# Patient Record
Sex: MALE | HISPANIC OR LATINO | URBAN - METROPOLITAN AREA
[De-identification: names, ages, dates, MRNs, and addresses within clinical notes are randomized per-mention and may not be internally consistent; named-entity substitution may affect disease eponyms.]

---

## 2017-01-04 ENCOUNTER — DOCTOR'S OFFICE (OUTPATIENT)
Dept: URBAN - METROPOLITAN AREA CLINIC 136 | Facility: CLINIC | Age: 52
Setting detail: OPHTHALMOLOGY
End: 2017-01-04
Payer: COMMERCIAL

## 2017-01-04 ENCOUNTER — OPTICAL OFFICE (OUTPATIENT)
Dept: URBAN - METROPOLITAN AREA CLINIC 143 | Facility: CLINIC | Age: 52
Setting detail: OPHTHALMOLOGY
End: 2017-01-04

## 2017-01-04 DIAGNOSIS — H52.223: ICD-10-CM

## 2017-01-04 DIAGNOSIS — H52.4: ICD-10-CM

## 2017-01-04 PROCEDURE — V2020 VISION SVCS FRAMES PURCHASES: HCPCS | Performed by: OPTOMETRIST

## 2017-01-04 PROCEDURE — 92015 DETERMINE REFRACTIVE STATE: CPT | Performed by: OPTOMETRIST

## 2017-01-04 PROCEDURE — V2781 PROGRESSIVE LENS PER LENS: HCPCS | Performed by: OPTOMETRIST

## 2017-01-04 ASSESSMENT — REFRACTION_MANIFEST
OD_VA3: 20/
OU_VA: 20/
OD_VA2: 20/
OS_VA1: 20/
OD_VA3: 20/
OD_VA1: 20/
OU_VA: 20/
OS_VA3: 20/
OS_VA2: 20/
OS_VA2: 20/
OD_VA2: 20/
OS_VA3: 20/
OS_VA1: 20/
OD_VA1: 20/

## 2017-01-04 ASSESSMENT — REFRACTION_CURRENTRX
OS_OVR_VA: 20/
OD_OVR_VA: 20/
OS_OVR_VA: 20/
OS_OVR_VA: 20/
OD_OVR_VA: 20/
OD_OVR_VA: 20/

## 2017-01-04 ASSESSMENT — SPHEQUIV_DERIVED
OD_SPHEQUIV: -2
OS_SPHEQUIV: -0.5

## 2017-01-04 ASSESSMENT — REFRACTION_AUTOREFRACTION
OD_CYLINDER: -1.50
OS_SPHERE: +0.25
OS_CYLINDER: -1.50
OD_AXIS: 073
OS_AXIS: 122
OD_SPHERE: -1.25

## 2017-01-04 ASSESSMENT — REFRACTION_OUTSIDERX
OS_AXIS: 120
OD_AXIS: 075
OD_VA1: 20/20
OD_VA3: 20/
OS_CYLINDER: -1.00
OD_SPHERE: -1.00
OD_CYLINDER: -2.00
OS_SPHERE: +0.25
OU_VA: 20/20
OD_ADD: +2.25
OS_ADD: +2.25
OD_VA2: 20/20 OU
OS_VA3: 20/
OS_VA1: 20/20
OS_VA2: 20/

## 2017-01-04 ASSESSMENT — KERATOMETRY
OS_K2POWER_DIOPTERS: 41.75
OS_AXISANGLE_DEGREES: 158
OS_K1POWER_DIOPTERS: 41.00

## 2017-01-04 ASSESSMENT — AXIALLENGTH_DERIVED: OS_AL: 24.6093

## 2017-01-04 ASSESSMENT — VISUAL ACUITY
OS_BCVA: 20/50+2
OD_BCVA: 20/20-2

## 2017-01-20 ENCOUNTER — DOCTOR'S OFFICE (OUTPATIENT)
Dept: URBAN - METROPOLITAN AREA CLINIC 136 | Facility: CLINIC | Age: 52
Setting detail: OPHTHALMOLOGY
End: 2017-01-20
Payer: COMMERCIAL

## 2017-01-20 DIAGNOSIS — Z96.1: ICD-10-CM

## 2017-01-20 DIAGNOSIS — H26.221: ICD-10-CM

## 2017-01-20 DIAGNOSIS — H25.11: ICD-10-CM

## 2017-01-20 DIAGNOSIS — H33.312: ICD-10-CM

## 2017-01-20 DIAGNOSIS — H33.022: ICD-10-CM

## 2017-01-20 DIAGNOSIS — H04.123: ICD-10-CM

## 2017-01-20 DIAGNOSIS — H35.379: ICD-10-CM

## 2017-01-20 PROBLEM — H26.492 POSTERIOR CAPSULAR OPACIFICATION; LEFT EYE: Status: RESOLVED | Noted: 2017-01-20 | Resolved: 2017-01-20

## 2017-01-20 PROBLEM — H44.432 HYPTONY ASSOCIATED WITH OTHER OCULAR DISORDERS; LEFT EYE: Status: RESOLVED | Noted: 2017-01-20 | Resolved: 2017-01-20

## 2017-01-20 PROCEDURE — 92134 CPTRZ OPH DX IMG PST SGM RTA: CPT | Performed by: OPHTHALMOLOGY

## 2017-01-20 PROCEDURE — 92014 COMPRE OPH EXAM EST PT 1/>: CPT | Performed by: OPHTHALMOLOGY

## 2017-01-20 ASSESSMENT — REFRACTION_OUTSIDERX
OD_AXIS: 075
OS_AXIS: 120
OS_VA3: 20/
OS_VA1: 20/20
OD_ADD: +2.25
OD_VA3: 20/
OS_SPHERE: +0.25
OD_SPHERE: -1.00
OS_VA2: 20/
OD_CYLINDER: -2.00
OD_VA2: 20/20 OU
OU_VA: 20/20
OS_CYLINDER: -1.00
OD_VA1: 20/20
OS_ADD: +2.25

## 2017-01-20 ASSESSMENT — CORNEAL SURGICAL SCARRING: OS_SCARRING: CENTRAL ANTERIOR STROMAL

## 2017-01-20 ASSESSMENT — REFRACTION_MANIFEST
OD_VA1: 20/
OS_VA2: 20/
OS_VA3: 20/
OU_VA: 20/
OD_VA3: 20/
OU_VA: 20/
OD_VA2: 20/
OD_VA2: 20/
OS_VA3: 20/
OS_VA1: 20/
OS_VA2: 20/
OS_VA1: 20/
OD_VA3: 20/
OD_VA1: 20/

## 2017-01-20 ASSESSMENT — REFRACTION_AUTOREFRACTION
OD_CYLINDER: -1.50
OS_AXIS: 122
OS_SPHERE: +0.25
OD_AXIS: 073
OS_CYLINDER: -1.50
OD_SPHERE: -1.25

## 2017-01-20 ASSESSMENT — REFRACTION_CURRENTRX
OD_OVR_VA: 20/
OS_OVR_VA: 20/
OD_OVR_VA: 20/
OS_OVR_VA: 20/
OD_OVR_VA: 20/
OS_OVR_VA: 20/

## 2017-01-20 ASSESSMENT — SPHEQUIV_DERIVED
OS_SPHEQUIV: -0.5
OD_SPHEQUIV: -2

## 2017-01-20 ASSESSMENT — CONFRONTATIONAL VISUAL FIELD TEST (CVF)
OD_FINDINGS: FULL
OS_FINDINGS: FULL

## 2017-01-20 ASSESSMENT — VISUAL ACUITY
OS_BCVA: 20/30-2
OD_BCVA: 20/20-3

## 2017-06-23 ENCOUNTER — DOCTOR'S OFFICE (OUTPATIENT)
Dept: URBAN - METROPOLITAN AREA CLINIC 136 | Facility: CLINIC | Age: 52
Setting detail: OPHTHALMOLOGY
End: 2017-06-23
Payer: COMMERCIAL

## 2017-06-23 DIAGNOSIS — Z96.1: ICD-10-CM

## 2017-06-23 DIAGNOSIS — H04.123: ICD-10-CM

## 2017-06-23 DIAGNOSIS — H25.11: ICD-10-CM

## 2017-06-23 PROCEDURE — 99214 OFFICE O/P EST MOD 30 MIN: CPT | Performed by: OPHTHALMOLOGY

## 2017-06-23 ASSESSMENT — REFRACTION_MANIFEST
OD_VA1: 20/
OD_VA3: 20/
OS_VA2: 20/
OS_VA1: 20/
OS_VA3: 20/
OD_VA3: 20/
OS_VA2: 20/
OD_VA2: 20/
OU_VA: 20/
OU_VA: 20/
OS_VA1: 20/
OS_VA3: 20/
OD_VA1: 20/
OD_VA2: 20/

## 2017-06-23 ASSESSMENT — CONFRONTATIONAL VISUAL FIELD TEST (CVF)
OD_FINDINGS: FULL
OS_FINDINGS: FULL

## 2017-06-23 ASSESSMENT — REFRACTION_OUTSIDERX
OS_AXIS: 120
OS_SPHERE: +0.25
OD_AXIS: 075
OD_VA2: 20/20 OU
OD_ADD: +2.25
OD_CYLINDER: -2.00
OD_VA3: 20/
OS_CYLINDER: -1.00
OU_VA: 20/20
OS_ADD: +2.25
OS_VA1: 20/20
OS_VA2: 20/
OS_VA3: 20/
OD_SPHERE: -1.00
OD_VA1: 20/20

## 2017-06-23 ASSESSMENT — REFRACTION_AUTOREFRACTION
OD_AXIS: 073
OS_CYLINDER: -1.50
OD_CYLINDER: -1.50
OS_AXIS: 122
OD_SPHERE: -1.25
OS_SPHERE: +0.25

## 2017-06-23 ASSESSMENT — REFRACTION_CURRENTRX
OS_OVR_VA: 20/
OD_OVR_VA: 20/
OD_OVR_VA: 20/
OS_OVR_VA: 20/
OD_OVR_VA: 20/
OS_OVR_VA: 20/

## 2017-06-23 ASSESSMENT — SUPERFICIAL PUNCTATE KERATITIS (SPK)
OS_SPK: 2+ 3+
OD_SPK: 2+

## 2017-06-23 ASSESSMENT — SPHEQUIV_DERIVED
OD_SPHEQUIV: -2
OS_SPHEQUIV: -0.5

## 2017-06-23 ASSESSMENT — VISUAL ACUITY
OS_BCVA: 20/30-2
OD_BCVA: 20/25

## 2017-06-23 ASSESSMENT — CORNEAL SURGICAL SCARRING: OS_SCARRING: CENTRAL ANTERIOR STROMAL

## 2017-07-03 ENCOUNTER — RX ONLY (RX ONLY)
Age: 52
End: 2017-07-03

## 2017-07-03 ENCOUNTER — DOCTOR'S OFFICE (OUTPATIENT)
Dept: URBAN - METROPOLITAN AREA CLINIC 136 | Facility: CLINIC | Age: 52
Setting detail: OPHTHALMOLOGY
End: 2017-07-03
Payer: COMMERCIAL

## 2017-07-03 DIAGNOSIS — Z96.1: ICD-10-CM

## 2017-07-03 DIAGNOSIS — H04.122: ICD-10-CM

## 2017-07-03 DIAGNOSIS — H04.121: ICD-10-CM

## 2017-07-03 DIAGNOSIS — H25.11: ICD-10-CM

## 2017-07-03 DIAGNOSIS — H40.013: ICD-10-CM

## 2017-07-03 PROCEDURE — 92012 INTRM OPH EXAM EST PATIENT: CPT | Performed by: OPHTHALMOLOGY

## 2017-07-03 PROCEDURE — 83861 MICROFLUID ANALY TEARS: CPT | Performed by: OPHTHALMOLOGY

## 2017-07-03 ASSESSMENT — SUPERFICIAL PUNCTATE KERATITIS (SPK)
OD_SPK: 2+
OS_SPK: 2+ 3+

## 2017-07-03 ASSESSMENT — CONFRONTATIONAL VISUAL FIELD TEST (CVF)
OS_FINDINGS: FULL
OD_FINDINGS: FULL

## 2017-07-03 ASSESSMENT — REFRACTION_MANIFEST
OS_VA1: 20/
OS_VA3: 20/
OS_VA1: 20/
OD_VA3: 20/
OD_VA2: 20/
OD_VA1: 20/
OS_VA3: 20/
OD_VA1: 20/
OD_VA3: 20/
OD_VA2: 20/
OS_VA2: 20/
OU_VA: 20/
OS_VA2: 20/
OU_VA: 20/

## 2017-07-03 ASSESSMENT — REFRACTION_CURRENTRX
OD_OVR_VA: 20/
OS_OVR_VA: 20/
OD_OVR_VA: 20/
OS_OVR_VA: 20/
OS_OVR_VA: 20/
OD_OVR_VA: 20/

## 2017-07-03 ASSESSMENT — REFRACTION_OUTSIDERX
OD_CYLINDER: -2.00
OS_VA1: 20/20
OS_SPHERE: +0.25
OD_AXIS: 075
OD_VA2: 20/20 OU
OD_VA3: 20/
OS_CYLINDER: -1.00
OS_VA2: 20/
OU_VA: 20/20
OD_ADD: +2.25
OD_VA1: 20/20
OS_ADD: +2.25
OS_VA3: 20/
OD_SPHERE: -1.00
OS_AXIS: 120

## 2017-07-03 ASSESSMENT — REFRACTION_AUTOREFRACTION
OD_SPHERE: -1.25
OS_SPHERE: +0.25
OS_CYLINDER: -1.50
OD_CYLINDER: -1.50
OD_AXIS: 073
OS_AXIS: 122

## 2017-07-03 ASSESSMENT — CORNEAL SURGICAL SCARRING: OS_SCARRING: CENTRAL ANTERIOR STROMAL

## 2017-07-03 ASSESSMENT — SPHEQUIV_DERIVED
OS_SPHEQUIV: -0.5
OD_SPHEQUIV: -2

## 2017-07-03 ASSESSMENT — VISUAL ACUITY
OD_BCVA: 20/25
OS_BCVA: 20/40+2

## 2017-07-03 ASSESSMENT — VASCULARIZATION: OS_VASCULARIZATION: STROMAL

## 2017-08-08 ENCOUNTER — GENERIC CONVERSION - ENCOUNTER (OUTPATIENT)
Dept: OTHER | Facility: OTHER | Age: 52
End: 2017-08-08

## 2017-08-22 ENCOUNTER — DOCTOR'S OFFICE (OUTPATIENT)
Dept: URBAN - METROPOLITAN AREA CLINIC 136 | Facility: CLINIC | Age: 52
Setting detail: OPHTHALMOLOGY
End: 2017-08-22
Payer: COMMERCIAL

## 2017-08-22 DIAGNOSIS — H40.013: ICD-10-CM

## 2017-08-22 DIAGNOSIS — H04.121: ICD-10-CM

## 2017-08-22 DIAGNOSIS — H25.11: ICD-10-CM

## 2017-08-22 DIAGNOSIS — Z96.1: ICD-10-CM

## 2017-08-22 DIAGNOSIS — H04.122: ICD-10-CM

## 2017-08-22 PROCEDURE — 92014 COMPRE OPH EXAM EST PT 1/>: CPT | Performed by: OPHTHALMOLOGY

## 2017-08-22 ASSESSMENT — REFRACTION_CURRENTRX
OD_OVR_VA: 20/
OS_OVR_VA: 20/
OD_OVR_VA: 20/
OD_OVR_VA: 20/
OS_OVR_VA: 20/
OS_OVR_VA: 20/

## 2017-08-22 ASSESSMENT — REFRACTION_MANIFEST
OD_VA1: 20/
OU_VA: 20/
OS_VA1: 20/
OS_VA2: 20/
OD_VA2: 20/
OD_VA3: 20/
OU_VA: 20/
OS_VA2: 20/
OS_VA1: 20/
OS_VA3: 20/
OD_VA1: 20/
OD_VA2: 20/
OD_VA3: 20/
OS_VA3: 20/

## 2017-08-22 ASSESSMENT — REFRACTION_OUTSIDERX
OS_CYLINDER: -1.00
OU_VA: 20/20
OS_VA3: 20/
OS_AXIS: 120
OS_VA2: 20/
OD_VA1: 20/20
OS_SPHERE: +0.25
OD_CYLINDER: -2.00
OD_VA3: 20/
OD_VA2: 20/20 OU
OS_VA1: 20/20
OD_ADD: +2.25
OD_SPHERE: -1.00
OS_ADD: +2.25
OD_AXIS: 075

## 2017-08-22 ASSESSMENT — REFRACTION_AUTOREFRACTION
OS_AXIS: 122
OD_AXIS: 073
OS_CYLINDER: -1.50
OS_SPHERE: +0.25
OD_SPHERE: -1.25
OD_CYLINDER: -1.50

## 2017-08-22 ASSESSMENT — VISUAL ACUITY
OS_BCVA: 20/30
OD_BCVA: 20/30+1

## 2017-08-22 ASSESSMENT — CONFRONTATIONAL VISUAL FIELD TEST (CVF)
OD_FINDINGS: FULL
OS_FINDINGS: FULL

## 2017-08-22 ASSESSMENT — SPHEQUIV_DERIVED
OS_SPHEQUIV: -0.5
OD_SPHEQUIV: -2

## 2017-08-22 ASSESSMENT — CORNEAL SURGICAL SCARRING: OS_SCARRING: CENTRAL ANTERIOR STROMAL

## 2017-08-22 ASSESSMENT — VASCULARIZATION: OS_VASCULARIZATION: STROMAL

## 2017-11-14 ENCOUNTER — DOCTOR'S OFFICE (OUTPATIENT)
Dept: URBAN - METROPOLITAN AREA CLINIC 136 | Facility: CLINIC | Age: 52
Setting detail: OPHTHALMOLOGY
End: 2017-11-14
Payer: COMMERCIAL

## 2017-11-14 ENCOUNTER — RX ONLY (RX ONLY)
Age: 52
End: 2017-11-14

## 2017-11-14 DIAGNOSIS — H40.013: ICD-10-CM

## 2017-11-14 DIAGNOSIS — H04.121: ICD-10-CM

## 2017-11-14 DIAGNOSIS — Z96.1: ICD-10-CM

## 2017-11-14 DIAGNOSIS — H04.122: ICD-10-CM

## 2017-11-14 DIAGNOSIS — H25.11: ICD-10-CM

## 2017-11-14 PROCEDURE — 92012 INTRM OPH EXAM EST PATIENT: CPT | Performed by: OPHTHALMOLOGY

## 2017-11-14 ASSESSMENT — REFRACTION_MANIFEST
OS_VA3: 20/
OS_VA2: 20/
OD_VA3: 20/
OS_VA1: 20/
OU_VA: 20/
OS_VA1: 20/
OD_VA1: 20/
OS_VA2: 20/
OS_VA3: 20/
OD_VA2: 20/
OD_VA1: 20/
OD_VA2: 20/
OU_VA: 20/
OD_VA3: 20/

## 2017-11-14 ASSESSMENT — REFRACTION_OUTSIDERX
OD_VA3: 20/
OD_ADD: +2.25
OU_VA: 20/20
OS_ADD: +2.25
OS_VA1: 20/20
OS_VA3: 20/
OS_CYLINDER: -1.00
OS_VA2: 20/
OD_SPHERE: -1.00
OD_VA2: 20/20 OU
OD_CYLINDER: -2.00
OS_AXIS: 120
OD_AXIS: 075
OD_VA1: 20/20
OS_SPHERE: +0.25

## 2017-11-14 ASSESSMENT — CONFRONTATIONAL VISUAL FIELD TEST (CVF)
OS_FINDINGS: FULL
OD_FINDINGS: FULL

## 2017-11-14 ASSESSMENT — REFRACTION_AUTOREFRACTION
OD_CYLINDER: -1.50
OD_AXIS: 073
OS_SPHERE: +0.25
OD_SPHERE: -1.25
OS_CYLINDER: -1.50
OS_AXIS: 122

## 2017-11-14 ASSESSMENT — VISUAL ACUITY
OS_BCVA: 20/30-2
OD_BCVA: 20/30+1

## 2017-11-14 ASSESSMENT — REFRACTION_CURRENTRX
OS_OVR_VA: 20/
OD_OVR_VA: 20/
OS_OVR_VA: 20/
OD_OVR_VA: 20/
OD_OVR_VA: 20/
OS_OVR_VA: 20/

## 2017-11-14 ASSESSMENT — VASCULARIZATION: OS_VASCULARIZATION: STROMAL

## 2017-11-14 ASSESSMENT — SPHEQUIV_DERIVED
OD_SPHEQUIV: -2
OS_SPHEQUIV: -0.5

## 2017-11-14 ASSESSMENT — CORNEAL SURGICAL SCARRING: OS_SCARRING: CENTRAL ANTERIOR STROMAL

## 2018-02-14 ENCOUNTER — DOCTOR'S OFFICE (OUTPATIENT)
Dept: URBAN - METROPOLITAN AREA CLINIC 136 | Facility: CLINIC | Age: 53
Setting detail: OPHTHALMOLOGY
End: 2018-02-14
Payer: COMMERCIAL

## 2018-02-14 DIAGNOSIS — H04.123: ICD-10-CM

## 2018-02-14 DIAGNOSIS — H25.11: ICD-10-CM

## 2018-02-14 DIAGNOSIS — H33.022: ICD-10-CM

## 2018-02-14 DIAGNOSIS — H33.312: ICD-10-CM

## 2018-02-14 DIAGNOSIS — Z96.1: ICD-10-CM

## 2018-02-14 DIAGNOSIS — H40.013: ICD-10-CM

## 2018-02-14 PROCEDURE — 92134 CPTRZ OPH DX IMG PST SGM RTA: CPT | Performed by: OPHTHALMOLOGY

## 2018-02-14 PROCEDURE — 92014 COMPRE OPH EXAM EST PT 1/>: CPT | Performed by: OPHTHALMOLOGY

## 2018-02-14 ASSESSMENT — CORNEAL SURGICAL SCARRING: OS_SCARRING: CENTRAL ANTERIOR STROMAL

## 2018-02-14 ASSESSMENT — CONFRONTATIONAL VISUAL FIELD TEST (CVF)
OD_FINDINGS: FULL
OS_FINDINGS: FULL

## 2018-02-14 ASSESSMENT — VASCULARIZATION: OS_VASCULARIZATION: STROMAL

## 2018-02-15 ASSESSMENT — REFRACTION_CURRENTRX
OD_OVR_VA: 20/
OD_OVR_VA: 20/
OS_OVR_VA: 20/
OS_OVR_VA: 20/
OD_OVR_VA: 20/
OS_OVR_VA: 20/

## 2018-02-15 ASSESSMENT — REFRACTION_MANIFEST
OS_VA2: 20/
OD_VA2: 20/
OD_VA3: 20/
OD_VA1: 20/
OS_VA2: 20/
OS_VA1: 20/
OS_VA1: 20/
OD_VA3: 20/
OS_VA3: 20/
OD_VA2: 20/
OS_VA3: 20/
OD_VA1: 20/
OU_VA: 20/
OU_VA: 20/

## 2018-02-15 ASSESSMENT — REFRACTION_AUTOREFRACTION
OS_SPHERE: +0.25
OS_AXIS: 122
OD_AXIS: 073
OS_CYLINDER: -1.50
OD_CYLINDER: -1.50
OD_SPHERE: -1.25

## 2018-02-15 ASSESSMENT — REFRACTION_OUTSIDERX
OS_CYLINDER: -1.00
OS_ADD: +2.25
OD_VA1: 20/20
OS_SPHERE: +0.25
OD_CYLINDER: -2.00
OU_VA: 20/20
OD_ADD: +2.25
OS_VA3: 20/
OS_VA1: 20/20
OD_VA2: 20/20 OU
OD_VA3: 20/
OS_VA2: 20/
OD_SPHERE: -1.00
OS_AXIS: 120
OD_AXIS: 075

## 2018-02-15 ASSESSMENT — SPHEQUIV_DERIVED
OD_SPHEQUIV: -2
OS_SPHEQUIV: -0.5

## 2018-02-15 ASSESSMENT — VISUAL ACUITY
OS_BCVA: 20/40+1
OD_BCVA: 20/30+-2

## 2018-06-26 ENCOUNTER — DOCTOR'S OFFICE (OUTPATIENT)
Dept: URBAN - METROPOLITAN AREA CLINIC 136 | Facility: CLINIC | Age: 53
Setting detail: OPHTHALMOLOGY
End: 2018-06-26
Payer: COMMERCIAL

## 2018-06-26 DIAGNOSIS — H33.022: ICD-10-CM

## 2018-06-26 DIAGNOSIS — H04.121: ICD-10-CM

## 2018-06-26 DIAGNOSIS — H25.11: ICD-10-CM

## 2018-06-26 DIAGNOSIS — H40.013: ICD-10-CM

## 2018-06-26 DIAGNOSIS — Z96.1: ICD-10-CM

## 2018-06-26 DIAGNOSIS — H04.122: ICD-10-CM

## 2018-06-26 DIAGNOSIS — H33.312: ICD-10-CM

## 2018-06-26 PROCEDURE — 92014 COMPRE OPH EXAM EST PT 1/>: CPT | Performed by: OPHTHALMOLOGY

## 2018-06-26 PROCEDURE — 83861 MICROFLUID ANALY TEARS: CPT | Performed by: OPHTHALMOLOGY

## 2018-06-26 PROCEDURE — 92133 CPTRZD OPH DX IMG PST SGM ON: CPT | Performed by: OPHTHALMOLOGY

## 2018-06-26 ASSESSMENT — REFRACTION_OUTSIDERX
OS_ADD: +2.25
OD_SPHERE: -1.00
OD_VA3: 20/
OD_VA2: 20/20 OU
OS_VA3: 20/
OD_ADD: +2.25
OD_CYLINDER: -2.00
OU_VA: 20/20
OS_AXIS: 120
OS_VA1: 20/20
OD_VA1: 20/20
OS_CYLINDER: -1.00
OS_VA2: 20/
OD_AXIS: 075
OS_SPHERE: +0.25

## 2018-06-26 ASSESSMENT — REFRACTION_CURRENTRX
OS_OVR_VA: 20/
OD_OVR_VA: 20/
OS_OVR_VA: 20/
OD_OVR_VA: 20/
OD_OVR_VA: 20/
OS_OVR_VA: 20/

## 2018-06-26 ASSESSMENT — REFRACTION_AUTOREFRACTION
OD_CYLINDER: -2.00
OS_CYLINDER: -1.00
OS_SPHERE: +0.25
OD_AXIS: 075
OS_AXIS: 100
OD_SPHERE: -0.75

## 2018-06-26 ASSESSMENT — KERATOMETRY
METHOD_AUTO_MANUAL: AUTO
OS_AXISANGLE_DEGREES: 038
OD_K1POWER_DIOPTERS: 41.25
OD_K2POWER_DIOPTERS: 42.25
OS_K2POWER_DIOPTERS: 41.00
OD_AXISANGLE_DEGREES: 155
OS_K1POWER_DIOPTERS: 40.25

## 2018-06-26 ASSESSMENT — VASCULARIZATION: OS_VASCULARIZATION: STROMAL

## 2018-06-26 ASSESSMENT — REFRACTION_MANIFEST
OS_VA3: 20/
OD_VA3: 20/
OD_VA2: 20/
OD_VA3: 20/
OS_VA2: 20/
OS_VA1: 20/
OD_VA1: 20/
OS_VA1: 20/
OS_VA3: 20/
OU_VA: 20/
OU_VA: 20/
OD_VA2: 20/
OD_VA1: 20/
OS_VA2: 20/

## 2018-06-26 ASSESSMENT — AXIALLENGTH_DERIVED
OS_AL: 24.8046
OD_AL: 24.9953

## 2018-06-26 ASSESSMENT — CONFRONTATIONAL VISUAL FIELD TEST (CVF)
OS_FINDINGS: FULL
OD_FINDINGS: FULL

## 2018-06-26 ASSESSMENT — VISUAL ACUITY
OS_BCVA: 20/60+1
OD_BCVA: 20/30-1

## 2018-06-26 ASSESSMENT — CORNEAL SURGICAL SCARRING: OS_SCARRING: CENTRAL ANTERIOR STROMAL

## 2018-06-26 ASSESSMENT — SPHEQUIV_DERIVED
OS_SPHEQUIV: -0.25
OD_SPHEQUIV: -1.75

## 2018-06-26 ASSESSMENT — SUPERFICIAL PUNCTATE KERATITIS (SPK): OS_SPK: 1+

## 2018-08-27 ENCOUNTER — RX ONLY (RX ONLY)
Age: 53
End: 2018-08-27

## 2019-01-15 ENCOUNTER — DOCTOR'S OFFICE (OUTPATIENT)
Dept: URBAN - METROPOLITAN AREA CLINIC 136 | Facility: CLINIC | Age: 54
Setting detail: OPHTHALMOLOGY
End: 2019-01-15
Payer: COMMERCIAL

## 2019-01-15 DIAGNOSIS — H25.11: ICD-10-CM

## 2019-01-15 DIAGNOSIS — Z96.1: ICD-10-CM

## 2019-01-15 DIAGNOSIS — H33.022: ICD-10-CM

## 2019-01-15 DIAGNOSIS — H04.121: ICD-10-CM

## 2019-01-15 DIAGNOSIS — H33.312: ICD-10-CM

## 2019-01-15 DIAGNOSIS — H04.122: ICD-10-CM

## 2019-01-15 DIAGNOSIS — H40.013: ICD-10-CM

## 2019-01-15 PROCEDURE — 92083 EXTENDED VISUAL FIELD XM: CPT | Performed by: OPHTHALMOLOGY

## 2019-01-15 PROCEDURE — 83861 MICROFLUID ANALY TEARS: CPT | Performed by: OPHTHALMOLOGY

## 2019-01-15 PROCEDURE — 92014 COMPRE OPH EXAM EST PT 1/>: CPT | Performed by: OPHTHALMOLOGY

## 2019-01-15 ASSESSMENT — REFRACTION_MANIFEST
OS_VA2: 20/
OD_VA3: 20/
OS_SPHERE: +0.25
OD_VA2: 20/
OD_SPHERE: -1.00
OS_VA3: 20/
OD_VA3: 20/
OD_AXIS: 075
OD_VA1: 20/
OS_ADD: +2.25
OU_VA: 20/20
OS_VA1: 20/20
OS_VA2: 20/
OS_AXIS: 120
OU_VA: 20/
OD_CYLINDER: -2.00
OS_VA3: 20/
OS_CYLINDER: -1.00
OD_ADD: +2.25
OD_VA2: 20/20 OU
OD_VA1: 20/20
OS_VA1: 20/

## 2019-01-15 ASSESSMENT — REFRACTION_AUTOREFRACTION
OD_CYLINDER: -2.00
OS_SPHERE: +0.25
OS_AXIS: 100
OD_SPHERE: -0.75
OD_AXIS: 075
OS_CYLINDER: -1.00

## 2019-01-15 ASSESSMENT — REFRACTION_CURRENTRX
OS_OVR_VA: 20/
OD_OVR_VA: 20/
OS_OVR_VA: 20/
OD_OVR_VA: 20/
OD_OVR_VA: 20/
OS_OVR_VA: 20/

## 2019-01-15 ASSESSMENT — SPHEQUIV_DERIVED
OD_SPHEQUIV: -1.75
OS_SPHEQUIV: -0.25
OD_SPHEQUIV: -2
OS_SPHEQUIV: -0.25

## 2019-01-15 ASSESSMENT — VISUAL ACUITY
OD_BCVA: 20/25-1
OS_BCVA: 20/25-1

## 2019-01-15 ASSESSMENT — CONFRONTATIONAL VISUAL FIELD TEST (CVF)
OD_FINDINGS: FULL
OS_FINDINGS: FULL

## 2019-01-15 ASSESSMENT — CORNEAL SURGICAL SCARRING: OS_SCARRING: CENTRAL ANTERIOR STROMAL

## 2019-01-15 ASSESSMENT — VASCULARIZATION: OS_VASCULARIZATION: STROMAL

## 2019-01-15 ASSESSMENT — SUPERFICIAL PUNCTATE KERATITIS (SPK): OS_SPK: 1+

## 2019-02-19 ENCOUNTER — DOCTOR'S OFFICE (OUTPATIENT)
Dept: URBAN - METROPOLITAN AREA CLINIC 136 | Facility: CLINIC | Age: 54
Setting detail: OPHTHALMOLOGY
End: 2019-02-19
Payer: COMMERCIAL

## 2019-02-19 DIAGNOSIS — H04.121: ICD-10-CM

## 2019-02-19 DIAGNOSIS — H04.122: ICD-10-CM

## 2019-02-19 DIAGNOSIS — H04.123: ICD-10-CM

## 2019-02-19 PROCEDURE — 83861 MICROFLUID ANALY TEARS: CPT | Performed by: OPHTHALMOLOGY

## 2019-02-19 PROCEDURE — 92012 INTRM OPH EXAM EST PATIENT: CPT | Performed by: OPHTHALMOLOGY

## 2019-02-19 ASSESSMENT — REFRACTION_MANIFEST
OS_VA3: 20/
OS_CYLINDER: -1.00
OS_SPHERE: +0.25
OD_VA2: 20/
OD_SPHERE: -1.00
OD_VA2: 20/20 OU
OD_AXIS: 075
OS_VA3: 20/
OU_VA: 20/
OS_VA2: 20/
OD_VA3: 20/
OS_ADD: +2.25
OD_VA1: 20/
OD_VA3: 20/
OS_VA2: 20/
OS_AXIS: 120
OS_VA1: 20/20
OS_VA1: 20/
OU_VA: 20/20
OD_VA1: 20/20
OD_CYLINDER: -2.00
OD_ADD: +2.25

## 2019-02-19 ASSESSMENT — VISUAL ACUITY
OS_BCVA: 20/25-1
OD_BCVA: 20/25-1

## 2019-02-19 ASSESSMENT — SPHEQUIV_DERIVED
OS_SPHEQUIV: -0.25
OD_SPHEQUIV: -1.75
OD_SPHEQUIV: -2
OS_SPHEQUIV: -0.25

## 2019-02-19 ASSESSMENT — REFRACTION_CURRENTRX
OD_OVR_VA: 20/
OD_OVR_VA: 20/
OS_OVR_VA: 20/
OS_OVR_VA: 20/
OD_OVR_VA: 20/
OS_OVR_VA: 20/

## 2019-02-19 ASSESSMENT — REFRACTION_AUTOREFRACTION
OS_AXIS: 100
OS_CYLINDER: -1.00
OD_CYLINDER: -2.00
OD_AXIS: 075
OS_SPHERE: +0.25
OD_SPHERE: -0.75

## 2019-02-19 ASSESSMENT — CONFRONTATIONAL VISUAL FIELD TEST (CVF)
OD_FINDINGS: FULL
OS_FINDINGS: FULL

## 2019-02-19 ASSESSMENT — CORNEAL SURGICAL SCARRING: OS_SCARRING: CENTRAL ANTERIOR STROMAL

## 2019-02-19 ASSESSMENT — DRY EYES - PHYSICIAN NOTES
OS_GENERALCOMMENTS: LOW TEAR FILM
OD_GENERALCOMMENTS: LOW TEAR FILM

## 2019-02-19 ASSESSMENT — VASCULARIZATION: OS_VASCULARIZATION: STROMAL

## 2019-03-12 ENCOUNTER — DOCTOR'S OFFICE (OUTPATIENT)
Dept: URBAN - METROPOLITAN AREA CLINIC 136 | Facility: CLINIC | Age: 54
Setting detail: OPHTHALMOLOGY
End: 2019-03-12
Payer: COMMERCIAL

## 2019-03-12 DIAGNOSIS — H40.013: ICD-10-CM

## 2019-03-12 DIAGNOSIS — H04.121: ICD-10-CM

## 2019-03-12 DIAGNOSIS — H04.122: ICD-10-CM

## 2019-03-12 DIAGNOSIS — H33.022: ICD-10-CM

## 2019-03-12 DIAGNOSIS — H33.312: ICD-10-CM

## 2019-03-12 PROCEDURE — 92014 COMPRE OPH EXAM EST PT 1/>: CPT | Performed by: OPHTHALMOLOGY

## 2019-03-12 ASSESSMENT — REFRACTION_MANIFEST
OS_AXIS: 120
OD_ADD: +2.25
OD_VA3: 20/
OD_AXIS: 075
OU_VA: 20/20
OU_VA: 20/
OD_SPHERE: -1.00
OD_VA3: 20/
OD_VA2: 20/20 OU
OS_VA2: 20/
OS_VA3: 20/
OD_CYLINDER: -2.00
OS_VA3: 20/
OS_VA1: 20/20
OS_SPHERE: +0.25
OS_VA1: 20/
OD_VA1: 20/20
OS_CYLINDER: -1.00
OD_VA1: 20/
OS_ADD: +2.25
OD_VA2: 20/
OS_VA2: 20/

## 2019-03-12 ASSESSMENT — VISUAL ACUITY
OD_BCVA: 20/30+2
OS_BCVA: 20/40

## 2019-03-12 ASSESSMENT — CORNEAL SURGICAL SCARRING: OS_SCARRING: CENTRAL ANTERIOR STROMAL

## 2019-03-12 ASSESSMENT — REFRACTION_CURRENTRX
OD_OVR_VA: 20/
OS_OVR_VA: 20/
OD_OVR_VA: 20/
OS_OVR_VA: 20/
OS_OVR_VA: 20/
OD_OVR_VA: 20/

## 2019-03-12 ASSESSMENT — REFRACTION_AUTOREFRACTION
OS_SPHERE: +0.25
OD_SPHERE: -0.75
OS_CYLINDER: -1.00
OD_AXIS: 075
OS_AXIS: 100
OD_CYLINDER: -2.00

## 2019-03-12 ASSESSMENT — VASCULARIZATION: OS_VASCULARIZATION: STROMAL

## 2019-03-12 ASSESSMENT — SPHEQUIV_DERIVED
OS_SPHEQUIV: -0.25
OS_SPHEQUIV: -0.25
OD_SPHEQUIV: -1.75
OD_SPHEQUIV: -2

## 2019-03-12 ASSESSMENT — DRY EYES - PHYSICIAN NOTES
OD_GENERALCOMMENTS: LOW TEAR FILM
OS_GENERALCOMMENTS: LOW TEAR FILM

## 2019-03-12 ASSESSMENT — CONFRONTATIONAL VISUAL FIELD TEST (CVF)
OD_FINDINGS: FULL
OS_FINDINGS: FULL

## 2019-04-09 ENCOUNTER — DOCTOR'S OFFICE (OUTPATIENT)
Dept: URBAN - METROPOLITAN AREA CLINIC 136 | Facility: CLINIC | Age: 54
Setting detail: OPHTHALMOLOGY
End: 2019-04-09
Payer: COMMERCIAL

## 2019-04-09 ENCOUNTER — RX ONLY (RX ONLY)
Age: 54
End: 2019-04-09

## 2019-04-09 DIAGNOSIS — H40.032: ICD-10-CM

## 2019-04-09 DIAGNOSIS — H04.122: ICD-10-CM

## 2019-04-09 DIAGNOSIS — H04.121: ICD-10-CM

## 2019-04-09 PROCEDURE — 92132 CPTRZD OPH DX IMG ANT SGM: CPT | Performed by: OPHTHALMOLOGY

## 2019-04-09 PROCEDURE — 92012 INTRM OPH EXAM EST PATIENT: CPT | Performed by: OPHTHALMOLOGY

## 2019-04-09 ASSESSMENT — REFRACTION_MANIFEST
OS_AXIS: 120
OD_VA3: 20/
OS_VA3: 20/
OU_VA: 20/20
OU_VA: 20/
OD_VA3: 20/
OD_CYLINDER: -2.00
OS_CYLINDER: -1.00
OD_VA1: 20/20
OS_VA1: 20/
OS_VA1: 20/20
OD_ADD: +2.25
OD_VA2: 20/20 OU
OS_VA3: 20/
OS_VA2: 20/
OD_AXIS: 075
OS_SPHERE: +0.25
OS_VA2: 20/
OD_SPHERE: -1.00
OD_VA1: 20/
OS_ADD: +2.25
OD_VA2: 20/

## 2019-04-09 ASSESSMENT — REFRACTION_CURRENTRX
OS_OVR_VA: 20/
OS_OVR_VA: 20/
OD_OVR_VA: 20/
OS_OVR_VA: 20/
OD_OVR_VA: 20/
OD_OVR_VA: 20/

## 2019-04-09 ASSESSMENT — REFRACTION_AUTOREFRACTION
OS_CYLINDER: -1.00
OD_CYLINDER: -2.00
OD_SPHERE: -0.75
OD_AXIS: 075
OS_AXIS: 100
OS_SPHERE: +0.25

## 2019-04-09 ASSESSMENT — SPHEQUIV_DERIVED
OS_SPHEQUIV: -0.25
OD_SPHEQUIV: -2
OD_SPHEQUIV: -1.75
OS_SPHEQUIV: -0.25

## 2019-04-09 ASSESSMENT — CONFRONTATIONAL VISUAL FIELD TEST (CVF)
OS_FINDINGS: FULL
OD_FINDINGS: FULL

## 2019-04-09 ASSESSMENT — VASCULARIZATION: OS_VASCULARIZATION: STROMAL

## 2019-04-09 ASSESSMENT — VISUAL ACUITY
OS_BCVA: 20/40
OD_BCVA: 20/30+2

## 2019-04-09 ASSESSMENT — DRY EYES - PHYSICIAN NOTES
OS_GENERALCOMMENTS: LOW TEAR FILM
OD_GENERALCOMMENTS: LOW TEAR FILM

## 2019-04-09 ASSESSMENT — CORNEAL SURGICAL SCARRING: OS_SCARRING: CENTRAL ANTERIOR STROMAL

## 2019-04-23 ENCOUNTER — DOCTOR'S OFFICE (OUTPATIENT)
Dept: URBAN - METROPOLITAN AREA CLINIC 136 | Facility: CLINIC | Age: 54
Setting detail: OPHTHALMOLOGY
End: 2019-04-23
Payer: COMMERCIAL

## 2019-04-23 ENCOUNTER — RX ONLY (RX ONLY)
Age: 54
End: 2019-04-23

## 2019-04-23 DIAGNOSIS — H04.123: ICD-10-CM

## 2019-04-23 PROCEDURE — 92012 INTRM OPH EXAM EST PATIENT: CPT | Performed by: OPHTHALMOLOGY

## 2019-04-23 ASSESSMENT — REFRACTION_MANIFEST
OD_VA2: 20/20 OU
OS_SPHERE: +0.25
OS_VA3: 20/
OS_ADD: +2.25
OU_VA: 20/20
OS_VA3: 20/
OU_VA: 20/
OS_VA1: 20/20
OS_VA1: 20/
OS_AXIS: 120
OS_VA2: 20/
OD_CYLINDER: -2.00
OD_VA1: 20/20
OD_VA2: 20/
OD_VA1: 20/
OS_CYLINDER: -1.00
OD_ADD: +2.25
OS_VA2: 20/
OD_AXIS: 075
OD_VA3: 20/
OD_VA3: 20/
OD_SPHERE: -1.00

## 2019-04-23 ASSESSMENT — CONFRONTATIONAL VISUAL FIELD TEST (CVF)
OS_FINDINGS: FULL
OD_FINDINGS: FULL

## 2019-04-23 ASSESSMENT — REFRACTION_AUTOREFRACTION
OS_SPHERE: +0.25
OS_CYLINDER: -1.00
OD_SPHERE: -0.75
OD_CYLINDER: -2.00
OS_AXIS: 100
OD_AXIS: 075

## 2019-04-23 ASSESSMENT — REFRACTION_CURRENTRX
OS_OVR_VA: 20/
OD_OVR_VA: 20/
OS_OVR_VA: 20/
OD_OVR_VA: 20/
OD_OVR_VA: 20/
OS_OVR_VA: 20/

## 2019-04-23 ASSESSMENT — VISUAL ACUITY
OS_BCVA: 20/40
OD_BCVA: 20/30+2

## 2019-04-23 ASSESSMENT — DRY EYES - PHYSICIAN NOTES
OD_GENERALCOMMENTS: LOW TEAR FILM
OS_GENERALCOMMENTS: LOW TEAR FILM

## 2019-04-23 ASSESSMENT — SPHEQUIV_DERIVED
OS_SPHEQUIV: -0.25
OS_SPHEQUIV: -0.25
OD_SPHEQUIV: -1.75
OD_SPHEQUIV: -2

## 2019-04-23 ASSESSMENT — CORNEAL SURGICAL SCARRING: OS_SCARRING: CENTRAL ANTERIOR STROMAL

## 2019-04-23 ASSESSMENT — VASCULARIZATION: OS_VASCULARIZATION: STROMAL

## 2020-07-10 ENCOUNTER — DOCTOR'S OFFICE (OUTPATIENT)
Dept: URBAN - METROPOLITAN AREA CLINIC 136 | Facility: CLINIC | Age: 55
Setting detail: OPHTHALMOLOGY
End: 2020-07-10
Payer: COMMERCIAL

## 2020-07-10 ENCOUNTER — RX ONLY (RX ONLY)
Age: 55
End: 2020-07-10

## 2020-07-10 DIAGNOSIS — H33.022: ICD-10-CM

## 2020-07-10 DIAGNOSIS — H40.043: ICD-10-CM

## 2020-07-10 DIAGNOSIS — H04.123: ICD-10-CM

## 2020-07-10 DIAGNOSIS — G43.109: ICD-10-CM

## 2020-07-10 DIAGNOSIS — H33.312: ICD-10-CM

## 2020-07-10 DIAGNOSIS — H25.11: ICD-10-CM

## 2020-07-10 PROCEDURE — 92020 GONIOSCOPY: CPT | Performed by: OPHTHALMOLOGY

## 2020-07-10 PROCEDURE — 92202 OPSCPY EXTND ON/MAC DRAW: CPT | Performed by: OPHTHALMOLOGY

## 2020-07-10 PROCEDURE — 92014 COMPRE OPH EXAM EST PT 1/>: CPT | Performed by: OPHTHALMOLOGY

## 2020-07-10 ASSESSMENT — CONFRONTATIONAL VISUAL FIELD TEST (CVF)
OD_FINDINGS: FULL
OS_FINDINGS: FULL

## 2020-07-12 ASSESSMENT — DRY EYES - PHYSICIAN NOTES
OD_GENERALCOMMENTS: LOW TEAR FILM
OS_GENERALCOMMENTS: LOW TEAR FILM

## 2020-07-12 ASSESSMENT — CORNEAL SURGICAL SCARRING: OS_SCARRING: CENTRAL ANTERIOR STROMAL

## 2020-07-12 ASSESSMENT — VASCULARIZATION: OS_VASCULARIZATION: STROMAL

## 2020-07-23 VITALS — HEIGHT: 60 IN

## 2020-07-23 ASSESSMENT — REFRACTION_AUTOREFRACTION
OS_SPHERE: +0.25
OD_SPHERE: -0.75
OS_CYLINDER: -1.00
OD_AXIS: 075
OS_AXIS: 100
OD_CYLINDER: -2.00

## 2020-07-23 ASSESSMENT — KERATOMETRY
OS_AXISANGLE_DEGREES: 038
OS_K1POWER_DIOPTERS: 40.25
OD_K1POWER_DIOPTERS: 41.25
OD_K2POWER_DIOPTERS: 42.25
METHOD_AUTO_MANUAL: AUTO
OD_AXISANGLE_DEGREES: 155
OS_K2POWER_DIOPTERS: 41.00

## 2020-07-23 ASSESSMENT — AXIALLENGTH_DERIVED
OS_AL: 24.8046
OS_AL: 24.8046
OD_AL: 24.9953
OD_AL: 25.1051

## 2020-07-23 ASSESSMENT — VISUAL ACUITY
OS_BCVA: 20/25-2
OD_BCVA: 20/30-2

## 2020-07-23 ASSESSMENT — REFRACTION_MANIFEST
OS_ADD: +2.25
OD_SPHERE: -1.00
OS_SPHERE: +0.25
OD_VA2: 20/20 OU
OU_VA: 20/20
OD_VA1: 20/20
OD_CYLINDER: -2.00
OS_VA1: 20/20
OS_CYLINDER: -1.00
OD_ADD: +2.25
OS_AXIS: 120
OD_AXIS: 075

## 2020-07-23 ASSESSMENT — SPHEQUIV_DERIVED
OS_SPHEQUIV: -0.25
OS_SPHEQUIV: -0.25
OD_SPHEQUIV: -1.75
OD_SPHEQUIV: -2

## 2021-01-22 ENCOUNTER — DOCTOR'S OFFICE (OUTPATIENT)
Dept: URBAN - METROPOLITAN AREA CLINIC 136 | Facility: CLINIC | Age: 56
Setting detail: OPHTHALMOLOGY
End: 2021-01-22
Payer: COMMERCIAL

## 2021-01-22 VITALS — HEIGHT: 60 IN

## 2021-01-22 DIAGNOSIS — H33.312: ICD-10-CM

## 2021-01-22 DIAGNOSIS — H04.123: ICD-10-CM

## 2021-01-22 DIAGNOSIS — H40.043: ICD-10-CM

## 2021-01-22 DIAGNOSIS — Z96.1: ICD-10-CM

## 2021-01-22 DIAGNOSIS — H25.11: ICD-10-CM

## 2021-01-22 DIAGNOSIS — H33.022: ICD-10-CM

## 2021-01-22 DIAGNOSIS — G43.109: ICD-10-CM

## 2021-01-22 PROBLEM — H52.223: Status: ACTIVE | Noted: 2017-01-20

## 2021-01-22 PROBLEM — H04.122: Status: ACTIVE | Noted: 2017-07-03

## 2021-01-22 PROBLEM — H04.121: Status: ACTIVE | Noted: 2017-07-03

## 2021-01-22 PROCEDURE — 92133 CPTRZD OPH DX IMG PST SGM ON: CPT | Performed by: OPHTHALMOLOGY

## 2021-01-22 PROCEDURE — 92012 INTRM OPH EXAM EST PATIENT: CPT | Performed by: OPHTHALMOLOGY

## 2021-01-22 ASSESSMENT — CONFRONTATIONAL VISUAL FIELD TEST (CVF)
OS_FINDINGS: FULL
OD_FINDINGS: FULL

## 2021-01-22 ASSESSMENT — REFRACTION_AUTOREFRACTION
OD_SPHERE: -1.75
OD_CYLINDER: -0.50
OS_SPHERE: -0.25
OD_AXIS: 037
OS_AXIS: 128
OS_CYLINDER: -0.75

## 2021-01-22 ASSESSMENT — REFRACTION_MANIFEST
OS_ADD: +2.25
OD_AXIS: 075
OD_VA2: 20/20 OU
OD_VA1: 20/20
OS_VA1: 20/20
OS_AXIS: 120
OU_VA: 20/20
OS_CYLINDER: -1.00
OS_SPHERE: +0.25
OD_SPHERE: -1.00
OD_CYLINDER: -2.00
OD_ADD: +2.25

## 2021-01-22 ASSESSMENT — KERATOMETRY
METHOD_AUTO_MANUAL: AUTO
OS_K2POWER_DIOPTERS: 41.00
OS_K1POWER_DIOPTERS: 40.25
OD_K2POWER_DIOPTERS: 42.25
OD_K1POWER_DIOPTERS: 41.25
OD_AXISANGLE_DEGREES: 155
OS_AXISANGLE_DEGREES: 038

## 2021-01-22 ASSESSMENT — AXIALLENGTH_DERIVED
OD_AL: 25.1051
OD_AL: 25.1051
OS_AL: 24.9672
OS_AL: 24.8046

## 2021-01-22 ASSESSMENT — VISUAL ACUITY
OS_BCVA: 20/50-2
OD_BCVA: 20/30-2

## 2021-01-22 ASSESSMENT — TONOMETRY
OS_IOP_MMHG: 13
OD_IOP_MMHG: 14

## 2021-01-22 ASSESSMENT — SPHEQUIV_DERIVED
OD_SPHEQUIV: -2
OD_SPHEQUIV: -2
OS_SPHEQUIV: -0.25
OS_SPHEQUIV: -0.625

## 2021-01-22 ASSESSMENT — VASCULARIZATION: OS_VASCULARIZATION: STROMAL

## 2021-01-22 ASSESSMENT — DRY EYES - PHYSICIAN NOTES
OS_GENERALCOMMENTS: LOW TEAR FILM
OD_GENERALCOMMENTS: LOW TEAR FILM

## 2021-01-22 ASSESSMENT — CORNEAL SURGICAL SCARRING: OS_SCARRING: CENTRAL ANTERIOR STROMAL

## 2021-02-17 ENCOUNTER — OFFICE VISIT (OUTPATIENT)
Dept: URGENT CARE | Facility: CLINIC | Age: 56
End: 2021-02-17
Payer: COMMERCIAL

## 2021-02-17 VITALS
WEIGHT: 210 LBS | HEIGHT: 66 IN | HEART RATE: 64 BPM | SYSTOLIC BLOOD PRESSURE: 162 MMHG | OXYGEN SATURATION: 97 % | RESPIRATION RATE: 18 BRPM | BODY MASS INDEX: 33.75 KG/M2 | TEMPERATURE: 97.7 F | DIASTOLIC BLOOD PRESSURE: 92 MMHG

## 2021-02-17 DIAGNOSIS — H66.002 NON-RECURRENT ACUTE SUPPURATIVE OTITIS MEDIA OF LEFT EAR WITHOUT SPONTANEOUS RUPTURE OF TYMPANIC MEMBRANE: Primary | ICD-10-CM

## 2021-02-17 PROBLEM — H66.92 OTITIS MEDIA OF LEFT EAR: Status: ACTIVE | Noted: 2021-02-17

## 2021-02-17 PROCEDURE — G0382 LEV 3 HOSP TYPE B ED VISIT: HCPCS | Performed by: FAMILY MEDICINE

## 2021-02-17 PROCEDURE — 99203 OFFICE O/P NEW LOW 30 MIN: CPT | Performed by: FAMILY MEDICINE

## 2021-02-17 PROCEDURE — 99283 EMERGENCY DEPT VISIT LOW MDM: CPT | Performed by: FAMILY MEDICINE

## 2021-02-17 RX ORDER — AZITHROMYCIN 250 MG/1
TABLET, FILM COATED ORAL
Qty: 6 TABLET | Refills: 0 | Status: SHIPPED | OUTPATIENT
Start: 2021-02-17 | End: 2021-02-21

## 2021-02-17 NOTE — PROGRESS NOTES
[id number]        NAME: Pamela Suh is a 63 y o  male  : 1965    MRN: [id number]  DATE: 2021  TIME: 12:32 PM    Assessment and Plan   Non-recurrent acute suppurative otitis media of left ear without spontaneous rupture of tympanic membrane [[id number] 002]  1  Non-recurrent acute suppurative otitis media of left ear without spontaneous rupture of tympanic membrane  azithromycin (ZITHROMAX) 250 mg tablet         Patient Instructions       Follow up with PCP in 3-5 days  Proceed to  ER if symptoms worsen  Chief Complaint     Chief Complaint   Patient presents with    Earache     left ear pain x 2 days that radiated from throat    Sore Throat     x 2 days         History of Present Illness       25-year-old male with a 3 day history of sore throat and left ear pain  His pain is described as a dull, achy sensation  He reports fullness sensation and dullness to hearing  He does have a history of ear infections in the past   He denies any fevers or chills  Denies any cough  Review of Systems   Review of Systems   Constitutional: Negative  HENT: Positive for ear pain  Eyes: Negative  Respiratory: Negative  Cardiovascular: Negative  Gastrointestinal: Negative  Genitourinary: Negative  Musculoskeletal: Negative  Skin: Negative  Allergic/Immunologic: Negative  Neurological: Negative  Hematological: Negative  Psychiatric/Behavioral: Negative            Current Medications       Current Outpatient Medications:     azithromycin (ZITHROMAX) 250 mg tablet, Take 2 tablets today then 1 tablet daily x 4 days, Disp: 6 tablet, Rfl: 0    Current Allergies     Allergies as of 2021 - Reviewed 2021   Allergen Reaction Noted    Penicillins Hives and Swelling 2010            The following portions of the patient's history were reviewed and updated as appropriate: allergies, current medications, past family history, past medical history, past social history, past surgical history and problem list      No past medical history on file  No past surgical history on file  No family history on file  Medications have been verified  Objective   /92   Pulse 64   Temp 97 7 °F (36 5 °C)   Resp 18   Ht 5' 6" (1 676 m)   Wt 95 3 kg (210 lb)   SpO2 97%   BMI 33 89 kg/m²   No LMP for male patient  Physical Exam     Physical Exam  Constitutional:       Appearance: He is well-developed  HENT:      Head: Normocephalic  Left Ear: Tenderness present  Tympanic membrane is injected and bulging  Eyes:      Pupils: Pupils are equal, round, and reactive to light  Neck:      Musculoskeletal: Normal range of motion  Pulmonary:      Effort: Pulmonary effort is normal    Musculoskeletal: Normal range of motion  Skin:     General: Skin is warm  Neurological:      Mental Status: He is alert and oriented to person, place, and time